# Patient Record
Sex: MALE | Race: WHITE | NOT HISPANIC OR LATINO
[De-identification: names, ages, dates, MRNs, and addresses within clinical notes are randomized per-mention and may not be internally consistent; named-entity substitution may affect disease eponyms.]

---

## 2019-02-26 PROBLEM — Z00.00 ENCOUNTER FOR PREVENTIVE HEALTH EXAMINATION: Status: ACTIVE | Noted: 2019-02-26

## 2019-04-11 ENCOUNTER — NON-APPOINTMENT (OUTPATIENT)
Age: 64
End: 2019-04-11

## 2019-04-11 ENCOUNTER — APPOINTMENT (OUTPATIENT)
Dept: CARDIOLOGY | Facility: CLINIC | Age: 64
End: 2019-04-11
Payer: MEDICARE

## 2019-04-11 VITALS
WEIGHT: 224 LBS | DIASTOLIC BLOOD PRESSURE: 72 MMHG | HEIGHT: 71 IN | BODY MASS INDEX: 31.36 KG/M2 | HEART RATE: 80 BPM | SYSTOLIC BLOOD PRESSURE: 132 MMHG | OXYGEN SATURATION: 97 %

## 2019-04-11 PROCEDURE — 93000 ELECTROCARDIOGRAM COMPLETE: CPT

## 2019-04-11 PROCEDURE — 99205 OFFICE O/P NEW HI 60 MIN: CPT

## 2019-04-22 ENCOUNTER — APPOINTMENT (OUTPATIENT)
Dept: CARDIOLOGY | Facility: CLINIC | Age: 64
End: 2019-04-22
Payer: MEDICARE

## 2019-04-22 PROCEDURE — 93306 TTE W/DOPPLER COMPLETE: CPT

## 2019-04-25 ENCOUNTER — APPOINTMENT (OUTPATIENT)
Dept: CARDIOLOGY | Facility: CLINIC | Age: 64
End: 2019-04-25
Payer: MEDICARE

## 2019-04-25 PROCEDURE — 78452 HT MUSCLE IMAGE SPECT MULT: CPT

## 2019-04-25 PROCEDURE — 93015 CV STRESS TEST SUPVJ I&R: CPT

## 2019-04-25 PROCEDURE — A9502: CPT

## 2019-05-06 ENCOUNTER — APPOINTMENT (OUTPATIENT)
Dept: CARDIOLOGY | Facility: CLINIC | Age: 64
End: 2019-05-06
Payer: MEDICARE

## 2019-05-06 VITALS
BODY MASS INDEX: 31.78 KG/M2 | HEIGHT: 71 IN | DIASTOLIC BLOOD PRESSURE: 70 MMHG | HEART RATE: 70 BPM | SYSTOLIC BLOOD PRESSURE: 132 MMHG | OXYGEN SATURATION: 97 % | WEIGHT: 227 LBS

## 2019-05-06 PROCEDURE — 99214 OFFICE O/P EST MOD 30 MIN: CPT

## 2019-05-06 NOTE — ASSESSMENT
[FreeTextEntry1] :  \par Hypertension, well controlled.  Continue current medication.  Low-salt diet.\par \par Dyslipidemia.  Low-cholesterol diet.  Continue current medications.  Lipid profile to be ordered.\par \par Obesity.  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  Echocardiography confirmed normal LV wall motion, LVEF; myocardial perfusion scan did not show any\par  inducible ischemia.\par \par Asc Aortic Aneurysm- repeat Echo in future.\par \par Aggressive risk factor modification has been discussed with him at a great length.  He will be reevaluated by me after cardiac testing.\par

## 2019-05-06 NOTE — HISTORY OF PRESENT ILLNESS
[FreeTextEntry1] :  \par Mr. Sainz is a very pleasant 63-year-old gentleman  came  for cardiac testing FU.   \par \par 19   Echo- dilated asc Aortic root, normal LVEF\par \par 19   Nuclear stress test; exercised for 9 minutes on Renny protocol with Apical fixed defect due to attenuaton.\par \par \par His history includes hypertension and dyslipidemia.  Recently, his EKG was found to be abnormal.  The patient gets short of breath easily but no exertional chest pain.  He denies PND, orthopnea, diaphoresis, dizziness, palpitations, or pedal edema.  He denies history of CHF, MI, or syncope.  The patient does not do exercise per se.  He knows he is overweight.  Unfortunately, he is unable to drop weight.  His father  at 80s with congestive heart failure and his sister  complication after open heart surgery.\par

## 2019-05-06 NOTE — PHYSICAL EXAM
[General Appearance - Well Developed] : well developed [Normal Appearance] : normal appearance [Well Groomed] : well groomed [No Deformities] : no deformities [General Appearance - Well Nourished] : well nourished [General Appearance - In No Acute Distress] : no acute distress [Normal Conjunctiva] : the conjunctiva exhibited no abnormalities [Eyelids - No Xanthelasma] : the eyelids demonstrated no xanthelasmas [Normal Oral Mucosa] : normal oral mucosa [No Oral Pallor] : no oral pallor [No Oral Cyanosis] : no oral cyanosis [Normal Jugular Venous V Waves Present] : normal jugular venous V waves present [Normal Jugular Venous A Waves Present] : normal jugular venous A waves present [No Jugular Venous Bates A Waves] : no jugular venous bates A waves [Respiration, Rhythm And Depth] : normal respiratory rhythm and effort [Auscultation Breath Sounds / Voice Sounds] : lungs were clear to auscultation bilaterally [Heart Rate And Rhythm] : heart rate and rhythm were normal [Exaggerated Use Of Accessory Muscles For Inspiration] : no accessory muscle use [Heart Sounds] : normal S1 and S2 [Murmurs] : no murmurs present [Abdomen Soft] : soft [Abdomen Tenderness] : non-tender [Abdomen Mass (___ Cm)] : no abdominal mass palpated [Abnormal Walk] : normal gait [Gait - Sufficient For Exercise Testing] : the gait was sufficient for exercise testing [Nail Clubbing] : no clubbing of the fingernails [Cyanosis, Localized] : no localized cyanosis [Petechial Hemorrhages (___cm)] : no petechial hemorrhages [Skin Color & Pigmentation] : normal skin color and pigmentation [] : no rash [No Venous Stasis] : no venous stasis [Skin Lesions] : no skin lesions [No Xanthoma] : no  xanthoma was observed [No Skin Ulcers] : no skin ulcer [Oriented To Time, Place, And Person] : oriented to person, place, and time [Mood] : the mood was normal [Affect] : the affect was normal [No Anxiety] : not feeling anxious

## 2019-11-04 ENCOUNTER — APPOINTMENT (OUTPATIENT)
Dept: CARDIOLOGY | Facility: CLINIC | Age: 64
End: 2019-11-04
Payer: MEDICARE

## 2019-11-04 VITALS
HEIGHT: 71 IN | HEART RATE: 67 BPM | BODY MASS INDEX: 31.5 KG/M2 | WEIGHT: 225 LBS | OXYGEN SATURATION: 96 % | DIASTOLIC BLOOD PRESSURE: 70 MMHG | SYSTOLIC BLOOD PRESSURE: 120 MMHG

## 2019-11-04 PROCEDURE — 99214 OFFICE O/P EST MOD 30 MIN: CPT

## 2019-11-04 RX ORDER — LISINOPRIL 20 MG/1
20 TABLET ORAL
Qty: 90 | Refills: 1 | Status: ACTIVE | COMMUNITY
Start: 1900-01-01 | End: 1900-01-01

## 2020-06-25 ENCOUNTER — NON-APPOINTMENT (OUTPATIENT)
Age: 65
End: 2020-06-25

## 2020-06-25 ENCOUNTER — APPOINTMENT (OUTPATIENT)
Dept: CARDIOLOGY | Facility: CLINIC | Age: 65
End: 2020-06-25
Payer: MEDICARE

## 2020-06-25 VITALS
SYSTOLIC BLOOD PRESSURE: 120 MMHG | DIASTOLIC BLOOD PRESSURE: 68 MMHG | WEIGHT: 230 LBS | TEMPERATURE: 98.7 F | OXYGEN SATURATION: 96 % | HEART RATE: 90 BPM | BODY MASS INDEX: 32.08 KG/M2

## 2020-06-25 PROCEDURE — 93000 ELECTROCARDIOGRAM COMPLETE: CPT

## 2020-06-25 PROCEDURE — 99214 OFFICE O/P EST MOD 30 MIN: CPT

## 2021-01-19 ENCOUNTER — APPOINTMENT (OUTPATIENT)
Dept: CARDIOLOGY | Facility: CLINIC | Age: 66
End: 2021-01-19
Payer: MEDICARE

## 2021-01-19 VITALS
SYSTOLIC BLOOD PRESSURE: 132 MMHG | DIASTOLIC BLOOD PRESSURE: 82 MMHG | WEIGHT: 234 LBS | BODY MASS INDEX: 32.64 KG/M2 | TEMPERATURE: 97.3 F | OXYGEN SATURATION: 98 % | HEART RATE: 70 BPM

## 2021-01-19 DIAGNOSIS — Z72.3 LACK OF PHYSICAL EXERCISE: ICD-10-CM

## 2021-01-19 DIAGNOSIS — Z82.49 FAMILY HISTORY OF ISCHEMIC HEART DISEASE AND OTHER DISEASES OF THE CIRCULATORY SYSTEM: ICD-10-CM

## 2021-01-19 DIAGNOSIS — Z78.9 OTHER SPECIFIED HEALTH STATUS: ICD-10-CM

## 2021-01-19 DIAGNOSIS — Z87.891 PERSONAL HISTORY OF NICOTINE DEPENDENCE: ICD-10-CM

## 2021-01-19 PROCEDURE — 99072 ADDL SUPL MATRL&STAF TM PHE: CPT

## 2021-01-19 PROCEDURE — 99214 OFFICE O/P EST MOD 30 MIN: CPT

## 2021-01-19 RX ORDER — ATORVASTATIN CALCIUM 20 MG/1
20 TABLET, FILM COATED ORAL DAILY
Qty: 90 | Refills: 1 | Status: ACTIVE | COMMUNITY

## 2021-06-24 ENCOUNTER — APPOINTMENT (OUTPATIENT)
Dept: ORTHOPEDIC SURGERY | Facility: CLINIC | Age: 66
End: 2021-06-24
Payer: MEDICARE

## 2021-06-24 VITALS
BODY MASS INDEX: 33.26 KG/M2 | WEIGHT: 237.6 LBS | HEIGHT: 71 IN | SYSTOLIC BLOOD PRESSURE: 164 MMHG | HEART RATE: 69 BPM | DIASTOLIC BLOOD PRESSURE: 93 MMHG | TEMPERATURE: 97.8 F

## 2021-06-24 DIAGNOSIS — Z85.820 PERSONAL HISTORY OF MALIGNANT MELANOMA OF SKIN: ICD-10-CM

## 2021-06-24 DIAGNOSIS — M17.10 UNILATERAL PRIMARY OSTEOARTHRITIS, UNSPECIFIED KNEE: ICD-10-CM

## 2021-06-24 DIAGNOSIS — Z86.39 PERSONAL HISTORY OF OTHER ENDOCRINE, NUTRITIONAL AND METABOLIC DISEASE: ICD-10-CM

## 2021-06-24 DIAGNOSIS — M16.10 UNILATERAL PRIMARY OSTEOARTHRITIS, UNSPECIFIED HIP: ICD-10-CM

## 2021-06-24 DIAGNOSIS — Z86.79 PERSONAL HISTORY OF OTHER DISEASES OF THE CIRCULATORY SYSTEM: ICD-10-CM

## 2021-06-24 PROCEDURE — 99203 OFFICE O/P NEW LOW 30 MIN: CPT

## 2021-06-24 PROCEDURE — 73502 X-RAY EXAM HIP UNI 2-3 VIEWS: CPT | Mod: RT

## 2021-06-30 ENCOUNTER — APPOINTMENT (OUTPATIENT)
Dept: CARDIOLOGY | Facility: CLINIC | Age: 66
End: 2021-06-30
Payer: MEDICARE

## 2021-06-30 ENCOUNTER — NON-APPOINTMENT (OUTPATIENT)
Age: 66
End: 2021-06-30

## 2021-06-30 VITALS
WEIGHT: 240 LBS | TEMPERATURE: 98 F | HEIGHT: 71 IN | DIASTOLIC BLOOD PRESSURE: 70 MMHG | BODY MASS INDEX: 33.6 KG/M2 | SYSTOLIC BLOOD PRESSURE: 110 MMHG | HEART RATE: 88 BPM | OXYGEN SATURATION: 96 %

## 2021-06-30 PROCEDURE — 93000 ELECTROCARDIOGRAM COMPLETE: CPT

## 2021-06-30 PROCEDURE — 99214 OFFICE O/P EST MOD 30 MIN: CPT

## 2021-06-30 RX ORDER — ASPIRIN 81 MG
81 TABLET, DELAYED RELEASE (ENTERIC COATED) ORAL
Refills: 0 | Status: ACTIVE | COMMUNITY

## 2021-06-30 NOTE — REVIEW OF SYSTEMS
[SOB] : shortness of breath [Palpitations] : palpitations [Joint Pain] : joint pain [Joint Stiffness] : joint stiffness [Negative] : Psychiatric [Lower Ext Edema] : no extremity edema

## 2021-06-30 NOTE — ASSESSMENT
[FreeTextEntry1] :  \par Hypertension, well controlled.  Continue current medication.  Low-salt diet.\par \par Dyslipidemia.  Low-cholesterol diet.  Continue current medications.  Most recent labs will be requested.  \par \par Obesity.  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 Stable.  No anginal symptoms. \par \par SOB/Palpitations:  Occurred with exertion in extreme heat.  Recommend increase hydration, and not to exert himself in extremely hot weather.  If symptoms persist, he will contact us immediately.  As episodes are very sporadic, small possibility of detecting rhythm with 2 week Zio.  Would consider 1 month event recorder if necessary. \par \par Asc Aortic Aneurysm- repeat Echo\par \par Aggressive risk factor modification has been discussed with him at a great length.  \par \par If echocardiogram is normal, he may proceed with the surgery.  This note will be updated once those results are available.\par

## 2021-06-30 NOTE — PHYSICAL EXAM
[General Appearance - Well Developed] : well developed [Normal Appearance] : normal appearance [Well Groomed] : well groomed [General Appearance - Well Nourished] : well nourished [No Deformities] : no deformities [General Appearance - In No Acute Distress] : no acute distress [Normal Conjunctiva] : the conjunctiva exhibited no abnormalities [Eyelids - No Xanthelasma] : the eyelids demonstrated no xanthelasmas [Normal Oral Mucosa] : normal oral mucosa [No Oral Pallor] : no oral pallor [No Oral Cyanosis] : no oral cyanosis [Normal Jugular Venous A Waves Present] : normal jugular venous A waves present [Normal Jugular Venous V Waves Present] : normal jugular venous V waves present [No Jugular Venous Bates A Waves] : no jugular venous bates A waves [Respiration, Rhythm And Depth] : normal respiratory rhythm and effort [Exaggerated Use Of Accessory Muscles For Inspiration] : no accessory muscle use [Auscultation Breath Sounds / Voice Sounds] : lungs were clear to auscultation bilaterally [Heart Rate And Rhythm] : heart rate and rhythm were normal [Heart Sounds] : normal S1 and S2 [Murmurs] : no murmurs present [Abdomen Soft] : soft [Abdomen Tenderness] : non-tender [Abdomen Mass (___ Cm)] : no abdominal mass palpated [Abnormal Walk] : normal gait [Gait - Sufficient For Exercise Testing] : the gait was sufficient for exercise testing [Nail Clubbing] : no clubbing of the fingernails [Cyanosis, Localized] : no localized cyanosis [Petechial Hemorrhages (___cm)] : no petechial hemorrhages [Skin Color & Pigmentation] : normal skin color and pigmentation [] : no rash [No Venous Stasis] : no venous stasis [Skin Lesions] : no skin lesions [No Skin Ulcers] : no skin ulcer [No Xanthoma] : no  xanthoma was observed [Oriented To Time, Place, And Person] : oriented to person, place, and time [Affect] : the affect was normal [Mood] : the mood was normal [No Anxiety] : not feeling anxious

## 2021-07-08 ENCOUNTER — OUTPATIENT (OUTPATIENT)
Dept: OUTPATIENT SERVICES | Facility: HOSPITAL | Age: 66
LOS: 1 days | End: 2021-07-08
Payer: COMMERCIAL

## 2021-07-08 VITALS
DIASTOLIC BLOOD PRESSURE: 76 MMHG | SYSTOLIC BLOOD PRESSURE: 131 MMHG | OXYGEN SATURATION: 96 % | RESPIRATION RATE: 14 BRPM | HEART RATE: 75 BPM | HEIGHT: 70 IN | WEIGHT: 239.86 LBS | TEMPERATURE: 98 F

## 2021-07-08 DIAGNOSIS — Z98.890 OTHER SPECIFIED POSTPROCEDURAL STATES: Chronic | ICD-10-CM

## 2021-07-08 DIAGNOSIS — Z96.642 PRESENCE OF LEFT ARTIFICIAL HIP JOINT: Chronic | ICD-10-CM

## 2021-07-08 DIAGNOSIS — M16.11 UNILATERAL PRIMARY OSTEOARTHRITIS, RIGHT HIP: ICD-10-CM

## 2021-07-08 DIAGNOSIS — Z01.818 ENCOUNTER FOR OTHER PREPROCEDURAL EXAMINATION: ICD-10-CM

## 2021-07-08 DIAGNOSIS — Z96.651 PRESENCE OF RIGHT ARTIFICIAL KNEE JOINT: Chronic | ICD-10-CM

## 2021-07-08 LAB
ALBUMIN SERPL ELPH-MCNC: 4.1 G/DL — SIGNIFICANT CHANGE UP (ref 3.3–5)
ALP SERPL-CCNC: 75 U/L — SIGNIFICANT CHANGE UP (ref 30–120)
ALT FLD-CCNC: 38 U/L DA — SIGNIFICANT CHANGE UP (ref 10–60)
ANION GAP SERPL CALC-SCNC: 8 MMOL/L — SIGNIFICANT CHANGE UP (ref 5–17)
APTT BLD: 29.4 SEC — SIGNIFICANT CHANGE UP (ref 27.5–35.5)
AST SERPL-CCNC: 25 U/L — SIGNIFICANT CHANGE UP (ref 10–40)
BILIRUB SERPL-MCNC: 0.8 MG/DL — SIGNIFICANT CHANGE UP (ref 0.2–1.2)
BLD GP AB SCN SERPL QL: SIGNIFICANT CHANGE UP
BUN SERPL-MCNC: 18 MG/DL — SIGNIFICANT CHANGE UP (ref 7–23)
CALCIUM SERPL-MCNC: 9.9 MG/DL — SIGNIFICANT CHANGE UP (ref 8.4–10.5)
CHLORIDE SERPL-SCNC: 104 MMOL/L — SIGNIFICANT CHANGE UP (ref 96–108)
CO2 SERPL-SCNC: 27 MMOL/L — SIGNIFICANT CHANGE UP (ref 22–31)
CREAT SERPL-MCNC: 0.9 MG/DL — SIGNIFICANT CHANGE UP (ref 0.5–1.3)
GLUCOSE SERPL-MCNC: 101 MG/DL — HIGH (ref 70–99)
HCT VFR BLD CALC: 43.5 % — SIGNIFICANT CHANGE UP (ref 39–50)
HGB BLD-MCNC: 15.2 G/DL — SIGNIFICANT CHANGE UP (ref 13–17)
INR BLD: 0.98 RATIO — SIGNIFICANT CHANGE UP (ref 0.88–1.16)
MCHC RBC-ENTMCNC: 32.2 PG — SIGNIFICANT CHANGE UP (ref 27–34)
MCHC RBC-ENTMCNC: 34.9 GM/DL — SIGNIFICANT CHANGE UP (ref 32–36)
MCV RBC AUTO: 92.2 FL — SIGNIFICANT CHANGE UP (ref 80–100)
MRSA PCR RESULT.: SIGNIFICANT CHANGE UP
NRBC # BLD: 0 /100 WBCS — SIGNIFICANT CHANGE UP (ref 0–0)
PLATELET # BLD AUTO: 217 K/UL — SIGNIFICANT CHANGE UP (ref 150–400)
POTASSIUM SERPL-MCNC: 4.2 MMOL/L — SIGNIFICANT CHANGE UP (ref 3.5–5.3)
POTASSIUM SERPL-SCNC: 4.2 MMOL/L — SIGNIFICANT CHANGE UP (ref 3.5–5.3)
PROT SERPL-MCNC: 7.7 G/DL — SIGNIFICANT CHANGE UP (ref 6–8.3)
PROTHROM AB SERPL-ACNC: 11.9 SEC — SIGNIFICANT CHANGE UP (ref 10.6–13.6)
RBC # BLD: 4.72 M/UL — SIGNIFICANT CHANGE UP (ref 4.2–5.8)
RBC # FLD: 12.3 % — SIGNIFICANT CHANGE UP (ref 10.3–14.5)
S AUREUS DNA NOSE QL NAA+PROBE: SIGNIFICANT CHANGE UP
SODIUM SERPL-SCNC: 139 MMOL/L — SIGNIFICANT CHANGE UP (ref 135–145)
WBC # BLD: 7.11 K/UL — SIGNIFICANT CHANGE UP (ref 3.8–10.5)
WBC # FLD AUTO: 7.11 K/UL — SIGNIFICANT CHANGE UP (ref 3.8–10.5)

## 2021-07-08 PROCEDURE — G0463: CPT

## 2021-07-08 RX ORDER — ASPIRIN/CALCIUM CARB/MAGNESIUM 324 MG
1 TABLET ORAL
Qty: 0 | Refills: 0 | DISCHARGE

## 2021-07-08 NOTE — H&P PST ADULT - NSICDXPASTMEDICALHX_GEN_ALL_CORE_FT
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

## 2021-07-08 NOTE — H&P PST ADULT - HISTORY OF PRESENT ILLNESS
64 yo male presents with 3 month history of right groin pain. Xrays revealed osteoarthritis. Currently reports 0-2/10 pain at rest, 5-6/10 pain with daily activities and increased to 9/10 with certain movements. Currently denies using any pain relief measures.

## 2021-07-08 NOTE — H&P PST ADULT - NSICDXFAMILYHX_GEN_ALL_CORE_FT
FAMILY HISTORY:  Father  Still living? No  Family history of CHF (congestive heart failure), Age at diagnosis: Age Unknown  Family history of prostate cancer, Age at diagnosis: Age Unknown  Family history of type 2 diabetes mellitus, Age at diagnosis: Age Unknown    Mother  Still living? No  Family history of Alzheimer's disease, Age at diagnosis: Age Unknown    Sibling  Still living? No  Family history of endocarditis, Age at diagnosis: Age Unknown  Family history of heart attack, Age at diagnosis: Age Unknown  Family history of heart valve abnormality, Age at diagnosis: Age Unknown    Grandparent  Still living? No  Family history of heart attack, Age at diagnosis: Age Unknown    Uncle  Still living? No  Family history of heart attack, Age at diagnosis: Age Unknown

## 2021-07-08 NOTE — H&P PST ADULT - NSANTHOSAYNRD_GEN_A_CORE
neck/No. TONIA screening performed.  STOP BANG Legend: 0-2 = LOW Risk; 3-4 = INTERMEDIATE Risk; 5-8 = HIGH Risk neck 18 inches/No. TONIA screening performed.  STOP BANG Legend: 0-2 = LOW Risk; 3-4 = INTERMEDIATE Risk; 5-8 = HIGH Risk

## 2021-07-08 NOTE — H&P PST ADULT - NSICDXPASTSURGICALHX_GEN_ALL_CORE_FT
PAST SURGICAL HISTORY:  H/O total knee replacement, right 2013    History of left hip replacement 2012    History of melanoma excision 2017, behind right ear    History of tonsillectomy and adenoidectomy as child

## 2021-07-08 NOTE — H&P PST ADULT - NSICDXPROBLEM_GEN_ALL_CORE_FT
PROBLEM DIAGNOSES  Problem: Primary osteoarthritis of right hip  Assessment and Plan: right THR. medical and cardiac clearances requested. obtain stress and echo reports. Instructed to stop aspirin 1 week prior to surgery. ERAS and surgical wash instructions reviewed and verbalized understanding.

## 2021-07-09 ENCOUNTER — APPOINTMENT (OUTPATIENT)
Dept: CARDIOLOGY | Facility: CLINIC | Age: 66
End: 2021-07-09
Payer: MEDICARE

## 2021-07-09 PROCEDURE — 93306 TTE W/DOPPLER COMPLETE: CPT

## 2021-07-13 ENCOUNTER — APPOINTMENT (OUTPATIENT)
Dept: CARDIOLOGY | Facility: CLINIC | Age: 66
End: 2021-07-13
Payer: MEDICARE

## 2021-07-13 VITALS
DIASTOLIC BLOOD PRESSURE: 70 MMHG | TEMPERATURE: 97.3 F | HEART RATE: 80 BPM | SYSTOLIC BLOOD PRESSURE: 120 MMHG | BODY MASS INDEX: 33.47 KG/M2 | OXYGEN SATURATION: 96 % | WEIGHT: 240 LBS

## 2021-07-13 PROCEDURE — 99214 OFFICE O/P EST MOD 30 MIN: CPT

## 2021-07-16 PROBLEM — E78.5 HYPERLIPIDEMIA, UNSPECIFIED: Chronic | Status: ACTIVE | Noted: 2021-07-08

## 2021-07-16 PROBLEM — M19.90 UNSPECIFIED OSTEOARTHRITIS, UNSPECIFIED SITE: Chronic | Status: ACTIVE | Noted: 2021-07-08

## 2021-07-16 PROBLEM — Z92.29 PERSONAL HISTORY OF OTHER DRUG THERAPY: Chronic | Status: ACTIVE | Noted: 2021-07-08

## 2021-07-16 PROBLEM — E66.9 OBESITY, UNSPECIFIED: Chronic | Status: ACTIVE | Noted: 2021-07-08

## 2021-07-16 PROBLEM — R94.31 ABNORMAL ELECTROCARDIOGRAM [ECG] [EKG]: Chronic | Status: ACTIVE | Noted: 2021-07-08

## 2021-07-16 PROBLEM — R06.00 DYSPNEA, UNSPECIFIED: Chronic | Status: ACTIVE | Noted: 2021-07-08

## 2021-07-16 PROBLEM — I71.2 THORACIC AORTIC ANEURYSM, WITHOUT RUPTURE: Chronic | Status: ACTIVE | Noted: 2021-07-08

## 2021-07-16 PROBLEM — C43.9 MALIGNANT MELANOMA OF SKIN, UNSPECIFIED: Chronic | Status: ACTIVE | Noted: 2021-07-08

## 2021-07-16 PROBLEM — I10 ESSENTIAL (PRIMARY) HYPERTENSION: Chronic | Status: ACTIVE | Noted: 2021-07-08

## 2021-07-18 ENCOUNTER — FORM ENCOUNTER (OUTPATIENT)
Age: 66
End: 2021-07-18

## 2021-07-19 ENCOUNTER — TRANSCRIPTION ENCOUNTER (OUTPATIENT)
Age: 66
End: 2021-07-19

## 2021-07-19 ENCOUNTER — APPOINTMENT (OUTPATIENT)
Dept: ORTHOPEDIC SURGERY | Facility: HOSPITAL | Age: 66
End: 2021-07-19

## 2021-07-19 ENCOUNTER — RESULT REVIEW (OUTPATIENT)
Age: 66
End: 2021-07-19

## 2021-07-19 ENCOUNTER — INPATIENT (INPATIENT)
Facility: HOSPITAL | Age: 66
LOS: 0 days | Discharge: HOME | DRG: 470 | End: 2021-07-20
Attending: ORTHOPAEDIC SURGERY | Admitting: ORTHOPAEDIC SURGERY
Payer: COMMERCIAL

## 2021-07-19 VITALS
HEIGHT: 70.5 IN | OXYGEN SATURATION: 98 % | TEMPERATURE: 99 F | RESPIRATION RATE: 18 BRPM | DIASTOLIC BLOOD PRESSURE: 77 MMHG | SYSTOLIC BLOOD PRESSURE: 127 MMHG | HEART RATE: 65 BPM | WEIGHT: 233.47 LBS

## 2021-07-19 DIAGNOSIS — Z96.642 PRESENCE OF LEFT ARTIFICIAL HIP JOINT: Chronic | ICD-10-CM

## 2021-07-19 DIAGNOSIS — M16.11 UNILATERAL PRIMARY OSTEOARTHRITIS, RIGHT HIP: ICD-10-CM

## 2021-07-19 DIAGNOSIS — Z98.890 OTHER SPECIFIED POSTPROCEDURAL STATES: Chronic | ICD-10-CM

## 2021-07-19 DIAGNOSIS — Z96.651 PRESENCE OF RIGHT ARTIFICIAL KNEE JOINT: Chronic | ICD-10-CM

## 2021-07-19 LAB
ANION GAP SERPL CALC-SCNC: 11 MMOL/L — SIGNIFICANT CHANGE UP (ref 5–17)
BUN SERPL-MCNC: 15 MG/DL — SIGNIFICANT CHANGE UP (ref 7–23)
CALCIUM SERPL-MCNC: 9.1 MG/DL — SIGNIFICANT CHANGE UP (ref 8.4–10.5)
CHLORIDE SERPL-SCNC: 104 MMOL/L — SIGNIFICANT CHANGE UP (ref 96–108)
CO2 SERPL-SCNC: 24 MMOL/L — SIGNIFICANT CHANGE UP (ref 22–31)
CREAT SERPL-MCNC: 0.94 MG/DL — SIGNIFICANT CHANGE UP (ref 0.5–1.3)
GLUCOSE SERPL-MCNC: 157 MG/DL — HIGH (ref 70–99)
HCT VFR BLD CALC: 37.1 % — LOW (ref 39–50)
HGB BLD-MCNC: 12.9 G/DL — LOW (ref 13–17)
POTASSIUM SERPL-MCNC: 3.8 MMOL/L — SIGNIFICANT CHANGE UP (ref 3.5–5.3)
POTASSIUM SERPL-SCNC: 3.8 MMOL/L — SIGNIFICANT CHANGE UP (ref 3.5–5.3)
SODIUM SERPL-SCNC: 139 MMOL/L — SIGNIFICANT CHANGE UP (ref 135–145)

## 2021-07-19 PROCEDURE — 88305 TISSUE EXAM BY PATHOLOGIST: CPT | Mod: 26

## 2021-07-19 PROCEDURE — 99223 1ST HOSP IP/OBS HIGH 75: CPT

## 2021-07-19 PROCEDURE — 73502 X-RAY EXAM HIP UNI 2-3 VIEWS: CPT | Mod: 26,RT

## 2021-07-19 PROCEDURE — 88311 DECALCIFY TISSUE: CPT | Mod: 26

## 2021-07-19 PROCEDURE — 27130 TOTAL HIP ARTHROPLASTY: CPT | Mod: RT

## 2021-07-19 RX ORDER — POLYETHYLENE GLYCOL 3350 17 G/17G
17 POWDER, FOR SOLUTION ORAL AT BEDTIME
Refills: 0 | Status: DISCONTINUED | OUTPATIENT
Start: 2021-07-19 | End: 2021-07-20

## 2021-07-19 RX ORDER — CHOLECALCIFEROL (VITAMIN D3) 125 MCG
1 CAPSULE ORAL
Qty: 0 | Refills: 0 | DISCHARGE

## 2021-07-19 RX ORDER — OXYCODONE HYDROCHLORIDE 5 MG/1
5 TABLET ORAL
Refills: 0 | Status: DISCONTINUED | OUTPATIENT
Start: 2021-07-19 | End: 2021-07-19

## 2021-07-19 RX ORDER — VANCOMYCIN HCL 1 G
1500 VIAL (EA) INTRAVENOUS ONCE
Refills: 0 | Status: COMPLETED | OUTPATIENT
Start: 2021-07-19 | End: 2021-07-19

## 2021-07-19 RX ORDER — TRANEXAMIC ACID 100 MG/ML
1000 INJECTION, SOLUTION INTRAVENOUS ONCE
Refills: 0 | Status: COMPLETED | OUTPATIENT
Start: 2021-07-19 | End: 2021-07-19

## 2021-07-19 RX ORDER — SODIUM CHLORIDE 9 MG/ML
1000 INJECTION, SOLUTION INTRAVENOUS
Refills: 0 | Status: DISCONTINUED | OUTPATIENT
Start: 2021-07-19 | End: 2021-07-20

## 2021-07-19 RX ORDER — TRAMADOL HYDROCHLORIDE 50 MG/1
50 TABLET ORAL EVERY 4 HOURS
Refills: 0 | Status: DISCONTINUED | OUTPATIENT
Start: 2021-07-19 | End: 2021-07-20

## 2021-07-19 RX ORDER — ONDANSETRON 8 MG/1
4 TABLET, FILM COATED ORAL ONCE
Refills: 0 | Status: DISCONTINUED | OUTPATIENT
Start: 2021-07-19 | End: 2021-07-19

## 2021-07-19 RX ORDER — ACETAMINOPHEN 500 MG
1000 TABLET ORAL EVERY 8 HOURS
Refills: 0 | Status: DISCONTINUED | OUTPATIENT
Start: 2021-07-20 | End: 2021-07-20

## 2021-07-19 RX ORDER — CHLORHEXIDINE GLUCONATE 213 G/1000ML
1 SOLUTION TOPICAL ONCE
Refills: 0 | Status: COMPLETED | OUTPATIENT
Start: 2021-07-19 | End: 2021-07-19

## 2021-07-19 RX ORDER — PANTOPRAZOLE SODIUM 20 MG/1
40 TABLET, DELAYED RELEASE ORAL
Refills: 0 | Status: DISCONTINUED | OUTPATIENT
Start: 2021-07-19 | End: 2021-07-20

## 2021-07-19 RX ORDER — SENNA PLUS 8.6 MG/1
2 TABLET ORAL AT BEDTIME
Refills: 0 | Status: DISCONTINUED | OUTPATIENT
Start: 2021-07-19 | End: 2021-07-20

## 2021-07-19 RX ORDER — ACETAMINOPHEN 500 MG
1000 TABLET ORAL ONCE
Refills: 0 | Status: COMPLETED | OUTPATIENT
Start: 2021-07-19 | End: 2021-07-19

## 2021-07-19 RX ORDER — HYDROMORPHONE HYDROCHLORIDE 2 MG/ML
0.5 INJECTION INTRAMUSCULAR; INTRAVENOUS; SUBCUTANEOUS
Refills: 0 | Status: DISCONTINUED | OUTPATIENT
Start: 2021-07-19 | End: 2021-07-20

## 2021-07-19 RX ORDER — LISINOPRIL 2.5 MG/1
20 TABLET ORAL AT BEDTIME
Refills: 0 | Status: DISCONTINUED | OUTPATIENT
Start: 2021-07-21 | End: 2021-07-20

## 2021-07-19 RX ORDER — LISINOPRIL 2.5 MG/1
1 TABLET ORAL
Qty: 0 | Refills: 0 | DISCHARGE

## 2021-07-19 RX ORDER — ATORVASTATIN CALCIUM 80 MG/1
20 TABLET, FILM COATED ORAL AT BEDTIME
Refills: 0 | Status: DISCONTINUED | OUTPATIENT
Start: 2021-07-19 | End: 2021-07-20

## 2021-07-19 RX ORDER — APREPITANT 80 MG/1
40 CAPSULE ORAL ONCE
Refills: 0 | Status: COMPLETED | OUTPATIENT
Start: 2021-07-19 | End: 2021-07-19

## 2021-07-19 RX ORDER — HYDROMORPHONE HYDROCHLORIDE 2 MG/ML
0.5 INJECTION INTRAMUSCULAR; INTRAVENOUS; SUBCUTANEOUS
Refills: 0 | Status: DISCONTINUED | OUTPATIENT
Start: 2021-07-19 | End: 2021-07-19

## 2021-07-19 RX ORDER — SODIUM CHLORIDE 9 MG/ML
1000 INJECTION, SOLUTION INTRAVENOUS
Refills: 0 | Status: DISCONTINUED | OUTPATIENT
Start: 2021-07-19 | End: 2021-07-19

## 2021-07-19 RX ORDER — ACETAMINOPHEN 500 MG
1000 TABLET ORAL ONCE
Refills: 0 | Status: COMPLETED | OUTPATIENT
Start: 2021-07-20 | End: 2021-07-20

## 2021-07-19 RX ORDER — MAGNESIUM HYDROXIDE 400 MG/1
30 TABLET, CHEWABLE ORAL DAILY
Refills: 0 | Status: DISCONTINUED | OUTPATIENT
Start: 2021-07-19 | End: 2021-07-20

## 2021-07-19 RX ORDER — APIXABAN 2.5 MG/1
2.5 TABLET, FILM COATED ORAL
Refills: 0 | Status: DISCONTINUED | OUTPATIENT
Start: 2021-07-20 | End: 2021-07-20

## 2021-07-19 RX ORDER — TRAMADOL HYDROCHLORIDE 50 MG/1
100 TABLET ORAL EVERY 6 HOURS
Refills: 0 | Status: DISCONTINUED | OUTPATIENT
Start: 2021-07-19 | End: 2021-07-20

## 2021-07-19 RX ORDER — SODIUM CHLORIDE 9 MG/ML
500 INJECTION INTRAMUSCULAR; INTRAVENOUS; SUBCUTANEOUS ONCE
Refills: 0 | Status: COMPLETED | OUTPATIENT
Start: 2021-07-19 | End: 2021-07-19

## 2021-07-19 RX ORDER — OXYCODONE HYDROCHLORIDE 5 MG/1
10 TABLET ORAL
Refills: 0 | Status: DISCONTINUED | OUTPATIENT
Start: 2021-07-19 | End: 2021-07-19

## 2021-07-19 RX ORDER — CELECOXIB 200 MG/1
200 CAPSULE ORAL
Refills: 0 | Status: DISCONTINUED | OUTPATIENT
Start: 2021-07-19 | End: 2021-07-20

## 2021-07-19 RX ORDER — ATORVASTATIN CALCIUM 80 MG/1
1 TABLET, FILM COATED ORAL
Qty: 0 | Refills: 0 | DISCHARGE

## 2021-07-19 RX ADMIN — SODIUM CHLORIDE 100 MILLILITER(S): 9 INJECTION, SOLUTION INTRAVENOUS at 20:20

## 2021-07-19 RX ADMIN — CELECOXIB 200 MILLIGRAM(S): 200 CAPSULE ORAL at 21:33

## 2021-07-19 RX ADMIN — Medication 1000 MILLIGRAM(S): at 20:20

## 2021-07-19 RX ADMIN — SODIUM CHLORIDE 75 MILLILITER(S): 9 INJECTION, SOLUTION INTRAVENOUS at 10:26

## 2021-07-19 RX ADMIN — SODIUM CHLORIDE 1000 MILLILITER(S): 9 INJECTION INTRAMUSCULAR; INTRAVENOUS; SUBCUTANEOUS at 10:25

## 2021-07-19 RX ADMIN — TRAMADOL HYDROCHLORIDE 50 MILLIGRAM(S): 50 TABLET ORAL at 17:29

## 2021-07-19 RX ADMIN — ATORVASTATIN CALCIUM 20 MILLIGRAM(S): 80 TABLET, FILM COATED ORAL at 21:34

## 2021-07-19 RX ADMIN — Medication 300 MILLIGRAM(S): at 17:29

## 2021-07-19 RX ADMIN — Medication 400 MILLIGRAM(S): at 14:57

## 2021-07-19 RX ADMIN — Medication 300 MILLIGRAM(S): at 06:25

## 2021-07-19 RX ADMIN — CELECOXIB 200 MILLIGRAM(S): 200 CAPSULE ORAL at 21:34

## 2021-07-19 RX ADMIN — Medication 400 MILLIGRAM(S): at 20:20

## 2021-07-19 RX ADMIN — APREPITANT 40 MILLIGRAM(S): 80 CAPSULE ORAL at 06:28

## 2021-07-19 RX ADMIN — TRAMADOL HYDROCHLORIDE 50 MILLIGRAM(S): 50 TABLET ORAL at 18:20

## 2021-07-19 RX ADMIN — Medication 1000 MILLIGRAM(S): at 14:57

## 2021-07-19 RX ADMIN — CHLORHEXIDINE GLUCONATE 1 APPLICATION(S): 213 SOLUTION TOPICAL at 06:28

## 2021-07-19 NOTE — DISCHARGE NOTE NURSING/CASE MANAGEMENT/SOCIAL WORK - PATIENT PORTAL LINK FT
You can access the FollowMyHealth Patient Portal offered by [facility] by registering at the following website: [email or web address]. By joining [employer]’s FollowMyHealth portal, you will also be able to view your health information using other applications (apps) compatible with our system.

## 2021-07-19 NOTE — DISCHARGE NOTE PROVIDER - NSDCFUSCHEDAPPT_GEN_ALL_CORE_FT
SRIRAM SONG ; 08/02/2021 ; [facility] [address] [facility]  SRIRAM SONG ; 09/07/2021 ; [facility] Card[facility] [address] SRIRAM Evangelista ; 09/21/2021 ; [facility] [address] [place] [address]

## 2021-07-19 NOTE — DISCHARGE NOTE PROVIDER - HOSPITAL COURSE
The patient is a 65 y.o. male with severe osteoarthritis of the right hip.  After admission on July 19, and receiving pre-operative parenteral prophylactic antibiotics, the patient  underwent an  uncomplicated right total hip replacement by orthopedic surgeon Dr. Myers.    A medical consultation from the Hospitalist service was obtained for post-operative medical co-management. Typical Physical & occupational therapy modalities post surgery were performed including ambulation training, range of motion, ADL's, abd transfers.  The patient had a clean appearing surgical incision with no sign of surgical site infections and had a stable neuro / vascular exam of the operated extremity.  After progression of mobility guided by the PT/ OT staff,  the patient was felt to benefit from further rehabilitative care for restoration to level of function. This was felt to best be accomplished in a ________________.  Discharge and Orthopedic Care instructions were delineated in the Discharge Plan and reviewed with the patient. All medications were delineated in the medication reconciliation tool and key points were reviewed with the patient. They were deemed stable from an Orthopedic & medical standpoint for discharge today.   The patient is a 65 y.o. male with severe osteoarthritis of the right hip.  After admission on July 19, and receiving pre-operative parenteral prophylactic antibiotics, the patient  underwent an  uncomplicated right total hip replacement by orthopedic surgeon Dr. Myers.    A medical consultation from the Hospitalist service was obtained for post-operative medical co-management. Typical Physical & occupational therapy modalities post surgery were performed including ambulation training, range of motion, ADL's, abd transfers.  The patient had a clean appearing surgical incision with no sign of surgical site infections and had a stable neuro / vascular exam of the operated extremity.  After progression of mobility guided by the PT/ OT staff,  the patient was felt to benefit from further rehabilitative care for restoration to level of function. This was felt to best be accomplished at  home.  Discharge and Orthopedic Care instructions were delineated in the Discharge Plan and reviewed with the patient. All medications were delineated in the medication reconciliation tool and key points were reviewed with the patient. They were deemed stable from an Orthopedic & medical standpoint for discharge today.   The patient is a 65 y.o. male with severe osteoarthritis of the right hip.  After admission on July 19, and receiving pre-operative parenteral prophylactic antibiotics, the patient  underwent an  uncomplicated right total hip replacement by orthopedic surgeon Dr. Myers.    A medical consultation from the Hospitalist service was obtained for post-operative medical co-management. Typical Physical & occupational therapy modalities post surgery were performed including ambulation training, range of motion, ADL's, abd transfers.  The patient had a clean appearing surgical incision with no sign of surgical site infections and had a stable neuro / vascular exam of the operated extremity.  After progression of mobility guided by the PT/ OT staff,  the patient was felt to benefit from further rehabilitative care for restoration to level of function. This was felt to best be accomplished at  home.  Discharge and Orthopedic Care instructions were delineated in the Discharge Plan and reviewed with the patient. All medications were delineated in the medication reconciliation tool and key points were reviewed with the patient. They were deemed stable from an Orthopedic & medical standpoint for discharge today.    Addendum: Dr Lauren Internal Medicine Attending.   Pt see and examined. Pt is cleared for discharge today.   (>30mins for discharge)

## 2021-07-19 NOTE — DISCHARGE NOTE PROVIDER - NSDCFUADDAPPT_GEN_ALL_CORE_FT
follow up with Dr. Myers on .... It is advisable to follow up with your primary care provider within the next 2-3 weeks to ensure your medications are appropriate and there are no underlying problems after your procedure.

## 2021-07-19 NOTE — BRIEF OPERATIVE NOTE - NSICDXBRIEFPREOP_GEN_ALL_CORE_FT
PRE-OP DIAGNOSIS:  Degenerative joint disease (DJD) of hip 19-Jul-2021 09:46:52 right Yoselyn Walker

## 2021-07-19 NOTE — CONSULT NOTE ADULT - ASSESSMENT
Mr Sainz is a 64 yo M presenting from home for elective R hip replacement. PMH HLD, HTN, Osteoarthritis.     Right Total Hip Replacement: POD#0.   -plan to start Eliquis tomorrow.   -pain control as ordered  -PT/OT  -orthopedics following    HLD: Continue Lipitor    HTN: BP stable and at goal.   -continue Lisinopril.     DVT ppx: Plan to start Eliquis

## 2021-07-19 NOTE — DISCHARGE NOTE PROVIDER - NSDCCPCAREPLAN_GEN_ALL_CORE_FT
PRINCIPAL DISCHARGE DIAGNOSIS  Diagnosis: Degenerative joint disease (DJD) of hip  Assessment and Plan of Treatment:

## 2021-07-19 NOTE — PHYSICAL THERAPY INITIAL EVALUATION ADULT - ADDITIONAL COMMENTS
Pt lives with spouse in a house w/ 1 step to enter.  Bedroom and bathroom on first floor.  Pt has rolling walker and straight cane

## 2021-07-19 NOTE — PHYSICAL THERAPY INITIAL EVALUATION ADULT - RANGE OF MOTION EXAMINATION, REHAB EVAL
right hip 0-90 deg due to hip precautions/Left LE ROM was WFL (within functional limits)/deficits as listed below

## 2021-07-19 NOTE — DISCHARGE NOTE PROVIDER - NSDCFUADDINST_GEN_ALL_CORE_FT
Call your doctor if you experience:  • An increase in pain not controlled by pain medication or change in activity or  position.  • Temperature greater than 101° F.  • Redness, increased swelling or foul smelling drainage from or around the  incision.  • Numbness, tingling or a change in color or temperature of the operative leg.  • Call your doctor immediately if you experience chest pain, shortness of breath or calf pain.

## 2021-07-19 NOTE — BRIEF OPERATIVE NOTE - NSICDXBRIEFPOSTOP_GEN_ALL_CORE_FT
POST-OP DIAGNOSIS:  Degenerative joint disease (DJD) of hip 19-Jul-2021 09:47:15 right Yoselyn Walker

## 2021-07-19 NOTE — DISCHARGE NOTE PROVIDER - NSDCCPTREATMENT_GEN_ALL_CORE_FT
PRINCIPAL PROCEDURE  Procedure: Hip joint replacement  Findings and Treatment: TOTAL HIP PRECAUTIONS  *Remember to continue all of the precautions for total hip replacement. Your surgeon will tell you when and if you can move beyond these limitations.  • Do not bend your hip more than 90 degrees   • Do not cross your legs or ankles when laying sitting or standing.  • Do not raise your operated leg up with your knee straight.  • DO NOT bend over at your waist.  • Avoid sitting in low, soft chairs such as sofas and car seats. You should sit on a chair using firm pillows to raise the height  of the seat.  • Make sure your bed level is high, so that you maintain proper leg positioning when sitting on the side, or getting in or out.  procedures.  • Avoid sitting in low, soft chairs, such as sofas, easy chairs etc.   • When entering and traveling by car:      -Sit in the front passenger seat. Make sure that the car seat is all the way back and semi-reclined before entering.  • Do not allow your knees to come together when sitting or lying in bed.  • Do not take a tub bath yet.   • Do not resume driving until you have your surgeon’s permission.  Wound Care  • Keep your incision clean and dry.  • You may use an ice 20 minutes on alternating with 20 minutes off for 48 hours post op packs for 20 minutes on and 20 minutes off to decrease pain and swelling.  • Follow up with your Primary Care Physician within 2 weeks from arrival home for examination and blood work.       PRINCIPAL PROCEDURE  Procedure: Hip joint replacement  Findings and Treatment: TOTAL HIP PRECAUTIONS  *Remember to continue all of the precautions for total hip replacement. Your surgeon will tell you when and if you can move beyond these limitations.  • Do not bend your hip more than 90 degrees   • Do not cross your legs or ankles when laying sitting or standing.  • Do not raise your operated leg up with your knee straight.  • DO NOT bend over at your waist.  • Avoid sitting in low, soft chairs such as sofas and car seats. You should sit on a chair using firm pillows to raise the height  of the seat.  • Make sure your bed level is high, so that you maintain proper leg positioning when sitting on the side, or getting in or out.  procedures.  • Avoid sitting in low, soft chairs, such as sofas, easy chairs etc.   • When entering and traveling by car:      -Sit in the front passenger seat. Make sure that the car seat is all the way back and semi-reclined before entering.  • Do not allow your knees to come together when sitting or lying in bed.  • Do not take a tub bath yet.   • Do not resume driving until you have your surgeon’s permission.  Wound Care  [place] dressing is over your hip wound.  It is waterproof and you may shower.  Do not aim shower stream at surgical site and pat dry with clean towel after showering.   Remove [place] dressing 7 days after surgery and leave wound open to air.

## 2021-07-19 NOTE — DISCHARGE NOTE PROVIDER - NSDCMRMEDTOKEN_GEN_ALL_CORE_FT
Aleve 220 mg oral tablet: 2 tab(s) orally every 12 hours, As Needed - for severe pain  atorvastatin 20 mg oral tablet: 1 tab(s) orally once a day (at bedtime)  William Low Dose 81 mg oral delayed release tablet: 1 tab(s) orally once a day (at bedtime)  lisinopril 20 mg oral tablet: 1 tab(s) orally once a day (at bedtime)  Multiple Vitamins oral tablet: 1 tab(s) orally once a day  Vitamin D3 1000 intl units (25 mcg) oral tablet: 1 tab(s) orally once a day   acetaminophen 500 mg oral tablet: 2 tab(s) orally every 8 hours  Aleve 220 mg oral tablet: 2 tab(s) orally every 12 hours, As Needed - for severe pain  apixaban 2.5 mg oral tablet: 1 tab(s) orally every 12 hours   apixaban 2.5 mg oral tablet: 1 tab(s) orally every 12 hours   atorvastatin 20 mg oral tablet: 1 tab(s) orally once a day (at bedtime)  William Low Dose 81 mg oral delayed release tablet: 1 tab(s) orally once a day (at bedtime)  celecoxib 200 mg oral capsule: 1 cap(s) orally 2 times a day. Take at least 2  hours after aspirin  lisinopril 20 mg oral tablet: 1 tab(s) orally once a day (at bedtime)  Multiple Vitamins oral tablet: 1 tab(s) orally once a day  omeprazole 20 mg oral delayed release capsule: 1 cap(s) orally once a day   traMADol 50 mg oral tablet: 1-2 tab(s) orally every 4 hours, As needed, Moderate to Severe Pain   Reference #: [id number] MDD:6  Vitamin D3 1000 intl units (25 mcg) oral tablet: 1 tab(s) orally once a day   acetaminophen 500 mg oral tablet: 2 tab(s) orally every 8 hours  apixaban 2.5 mg oral tablet: 1 tab(s) orally every 12 hours   apixaban 2.5 mg oral tablet: 1 tab(s) orally every 12 hours   atorvastatin 20 mg oral tablet: 1 tab(s) orally once a day (at bedtime)  celecoxib 200 mg oral capsule: 1 cap(s) orally 2 times a day. Take at least 2  hours after aspirin  lisinopril 20 mg oral tablet: 1 tab(s) orally once a day (at bedtime)  Multiple Vitamins oral tablet: 1 tab(s) orally once a day  omeprazole 20 mg oral delayed release capsule: 1 cap(s) orally once a day   traMADol 50 mg oral tablet: 1-2 tab(s) orally every 4 hours, As needed, Moderate to Severe Pain   Reference #: [id number] MDD:6  Vitamin D3 1000 intl units (25 mcg) oral tablet: 1 tab(s) orally once a day

## 2021-07-19 NOTE — DISCHARGE NOTE PROVIDER - CARE PROVIDER_API CALL
Deuce Myers)  Orthopaedic Surgery  [address] [facility], [address]  [place] [address]  Phone: [phone]  Fax: [phone]  Follow Up Time:    Deuce Myers)  Orthopaedic Surgery  [address] [facility], [address]  [place] [address]  Phone: [phone]  Fax: [phone]  Scheduled Appointment: 08/02/2021 11:20 AM

## 2021-07-19 NOTE — CONSULT NOTE ADULT - SUBJECTIVE AND OBJECTIVE BOX
Mr Sainz is a 66 yo M presenting from home for elective R hip replacement. PMH HLD, HTN, Osteoarthritis.   Patient seen and examined post-op. He is feeling well. His R hip pain is controlled, currently an achy sensation he states. He is tolerating PO intake and was able to ambulate with physical therapy.     Allergies: Ampicillin, Penicillin  Social history: social etoh use, quit smoking 3 decades ago, no drug use.   Family history: HTN in father  surgical history: R hip replacement.     CONSTITUTIONAL: denies fever, chills, fatigue, weakness  SKIN: denies new lesions, rash  CARDIOVASCULAR: denies chest pain, chest pressure, palpitations  RESPIRATORY: denies shortness of breath, sputum production  GASTROINTESTINAL: denies nausea, vomiting, diarrhea, abdominal pain, melena, hematochezia  GENITOURINARY: denies dysuria, discharge  NEUROLOGICAL: denies numbness, headache, focal weakness  MUSCULOSKELETAL: R hip soreness  HEMATOLOGIC: denies gross bleeding, bruising    T(C): 36.6 (07-19-21 @ 09:43), Max: 37 (07-19-21 @ 05:58)  HR: 68 (07-19-21 @ 11:30) (62 - 68)  BP: 109/58 (07-19-21 @ 11:30) (93/55 - 127/77)  RR: 15 (07-19-21 @ 11:00) (14 - 18)  SpO2: 96% (07-19-21 @ 11:30) (95% - 99%)  Wt(kg): --    Physical Exam:   GENERAL: well-groomed, well-developed, NAD  HEENT: head NC/AT; EOM intact, conjunctiva & sclera clear; hearing grossly intact, moist mucous membranes  NECK: supple, no JVD  RESPIRATORY: CTA B/L, no wheezing, rales, rhonchi or rubs  CARDIOVASCULAR: S1&S2, RRR, no murmurs or gallops  ABDOMEN: soft, non-tender, non-distended, + Bowel sounds x4 quadrants, no guarding, rebound or rigidity  MUSCULOSKELETAL:  no clubbing, cyanosis or edema of all 4 extremities  LYMPH: no cervical lymphadenopathy  VASCULAR: Radial pulses 2+ bilaterally, no varicose veins   SKIN: warm and dry, color normal  NEUROLOGIC: AA&O X3, CN2-12 intact w/ no focal deficits, no sensory loss, decreased ROM R hip  Psych: Normal mood and affect, normal behavior

## 2021-07-19 NOTE — DISCHARGE NOTE PROVIDER - INSTRUCTIONS
Regular diet  The pain medications are likely to cause constipation.  This problem can be minimized by increasing your fruits and vegetables and fluid intake.  You may benefit from over-the-counter laxatives (such as senokot) and stool softeners such as Colace 100mg 3 times daily.

## 2021-07-19 NOTE — DISCHARGE NOTE NURSING/CASE MANAGEMENT/SOCIAL WORK - NSSCNAMETXT_GEN_ALL_CORE
[facility] ([facility])      [facility] [phone]  [address] [place] [address]

## 2021-07-19 NOTE — DISCHARGE NOTE NURSING/CASE MANAGEMENT/SOCIAL WORK - CASE MANAGER'S NAME
Your [facility] RN/ Xochitl Markham can be reached at [phone] or main office # [phone]

## 2021-07-19 NOTE — DISCHARGE NOTE PROVIDER - PROVIDER TOKENS
PROVIDER:[TOKEN:[[id number]]] PROVIDER:[TOKEN:[[id number]],SCHEDULEDAPPT:[08/02/2021],SCHEDULEDAPPTTIME:[11:20 AM]]

## 2021-07-19 NOTE — PHYSICAL THERAPY INITIAL EVALUATION ADULT - GAIT DEVIATIONS NOTED, PT EVAL
decreased xin/decreased velocity of limb motion/decreased step length/decreased stride length/decreased weight-shifting ability

## 2021-07-19 NOTE — ASU PREOP CHECKLIST - BLOOD AVAILABLE
Called by nurse re: afib to 140's.  Evaluated pt at bedside.     PE:  BP/120/60's; hr 140-150's irregularly irregular, sat 92% 2 L nc    AO4; NAD  irregularly irregular tachycardic  Lung: bibasilar crackles    After evaluation of pt, who was stable and mentating well, confirmed rhythm on tele monitor and gave 12.5 metoprolol PO per day team.    Reevaluated patient in 30 minutes. BP's stable during that time to 100's/60's.     Upon reevaluation, pt was still tachycardic afib to 140's. At this point; pt given 10mg IV cardizem with some temporary reduction to 120's.     Plan discussed with MAR and MAR came to bedside.      Pt's bp's labile during this time though most readings were in 's/50's.     After confirmation of bp and with continued tachycardia; 5mg Lopressor IV given with reduction of heart rate to 100-110. Another dose of 12.5mg metoprolol tartrate also given.    Pt's bp stabilized and was alert and oriented throughout.     Plan:  will ctm on tele  ctm blood pressures  will receive am labs early and monitor.    Eamon Cárdenas, pgy 1 no

## 2021-07-19 NOTE — OCCUPATIONAL THERAPY INITIAL EVALUATION ADULT - ADDITIONAL COMMENTS
Pt lives in a private home [place] [address] +railing to  basement   +stall shower pt owns a RW, SAC crutches 3in1 and hip kit

## 2021-07-19 NOTE — DISCHARGE NOTE NURSING/CASE MANAGEMENT/SOCIAL WORK - NSSCCONTNUM_GEN_ALL_CORE
Please contact the home care agency at [phone] if you have not heard from them by 12 noon on the day after your hospital discharge

## 2021-07-20 VITALS
SYSTOLIC BLOOD PRESSURE: 130 MMHG | DIASTOLIC BLOOD PRESSURE: 82 MMHG | TEMPERATURE: 99 F | OXYGEN SATURATION: 96 % | HEART RATE: 92 BPM | RESPIRATION RATE: 17 BRPM

## 2021-07-20 LAB
ANION GAP SERPL CALC-SCNC: 8 MMOL/L — SIGNIFICANT CHANGE UP (ref 5–17)
BUN SERPL-MCNC: 14 MG/DL — SIGNIFICANT CHANGE UP (ref 7–23)
CALCIUM SERPL-MCNC: 8.9 MG/DL — SIGNIFICANT CHANGE UP (ref 8.4–10.5)
CHLORIDE SERPL-SCNC: 105 MMOL/L — SIGNIFICANT CHANGE UP (ref 96–108)
CO2 SERPL-SCNC: 24 MMOL/L — SIGNIFICANT CHANGE UP (ref 22–31)
COVID-19 SPIKE DOMAIN AB INTERP: POSITIVE
COVID-19 SPIKE DOMAIN ANTIBODY RESULT: >250 U/ML — HIGH
CREAT SERPL-MCNC: 0.82 MG/DL — SIGNIFICANT CHANGE UP (ref 0.5–1.3)
GLUCOSE SERPL-MCNC: 137 MG/DL — HIGH (ref 70–99)
HCT VFR BLD CALC: 34.5 % — LOW (ref 39–50)
HGB BLD-MCNC: 11.8 G/DL — LOW (ref 13–17)
MCHC RBC-ENTMCNC: 31.9 PG — SIGNIFICANT CHANGE UP (ref 27–34)
MCHC RBC-ENTMCNC: 34.2 GM/DL — SIGNIFICANT CHANGE UP (ref 32–36)
MCV RBC AUTO: 93.2 FL — SIGNIFICANT CHANGE UP (ref 80–100)
NRBC # BLD: 0 /100 WBCS — SIGNIFICANT CHANGE UP (ref 0–0)
PLATELET # BLD AUTO: 164 K/UL — SIGNIFICANT CHANGE UP (ref 150–400)
POTASSIUM SERPL-MCNC: 4.1 MMOL/L — SIGNIFICANT CHANGE UP (ref 3.5–5.3)
POTASSIUM SERPL-SCNC: 4.1 MMOL/L — SIGNIFICANT CHANGE UP (ref 3.5–5.3)
RBC # BLD: 3.7 M/UL — LOW (ref 4.2–5.8)
RBC # FLD: 12.3 % — SIGNIFICANT CHANGE UP (ref 10.3–14.5)
SARS-COV-2 IGG+IGM SERPL QL IA: >250 U/ML — HIGH
SARS-COV-2 IGG+IGM SERPL QL IA: POSITIVE
SODIUM SERPL-SCNC: 137 MMOL/L — SIGNIFICANT CHANGE UP (ref 135–145)
WBC # BLD: 14.83 K/UL — HIGH (ref 3.8–10.5)
WBC # FLD AUTO: 14.83 K/UL — HIGH (ref 3.8–10.5)

## 2021-07-20 PROCEDURE — 88305 TISSUE EXAM BY PATHOLOGIST: CPT

## 2021-07-20 PROCEDURE — 80048 BASIC METABOLIC PNL TOTAL CA: CPT

## 2021-07-20 PROCEDURE — 85014 HEMATOCRIT: CPT

## 2021-07-20 PROCEDURE — 97535 SELF CARE MNGMENT TRAINING: CPT

## 2021-07-20 PROCEDURE — 85027 COMPLETE CBC AUTOMATED: CPT

## 2021-07-20 PROCEDURE — 99232 SBSQ HOSP IP/OBS MODERATE 35: CPT

## 2021-07-20 PROCEDURE — 97165 OT EVAL LOW COMPLEX 30 MIN: CPT

## 2021-07-20 PROCEDURE — 85018 HEMOGLOBIN: CPT

## 2021-07-20 PROCEDURE — C1713: CPT

## 2021-07-20 PROCEDURE — 86900 BLOOD TYPING SEROLOGIC ABO: CPT

## 2021-07-20 PROCEDURE — 86850 RBC ANTIBODY SCREEN: CPT

## 2021-07-20 PROCEDURE — 86769 SARS-COV-2 COVID-19 ANTIBODY: CPT

## 2021-07-20 PROCEDURE — 86901 BLOOD TYPING SEROLOGIC RH(D): CPT

## 2021-07-20 PROCEDURE — 73502 X-RAY EXAM HIP UNI 2-3 VIEWS: CPT

## 2021-07-20 PROCEDURE — 97116 GAIT TRAINING THERAPY: CPT

## 2021-07-20 PROCEDURE — 88311 DECALCIFY TISSUE: CPT

## 2021-07-20 PROCEDURE — 94664 DEMO&/EVAL PT USE INHALER: CPT

## 2021-07-20 PROCEDURE — 97530 THERAPEUTIC ACTIVITIES: CPT

## 2021-07-20 PROCEDURE — 36415 COLL VENOUS BLD VENIPUNCTURE: CPT

## 2021-07-20 PROCEDURE — 97161 PT EVAL LOW COMPLEX 20 MIN: CPT

## 2021-07-20 PROCEDURE — C1776: CPT

## 2021-07-20 RX ORDER — APIXABAN 2.5 MG/1
1 TABLET, FILM COATED ORAL
Qty: 60 | Refills: 0
Start: 2021-07-20 | End: 2021-08-18

## 2021-07-20 RX ORDER — TRAMADOL HYDROCHLORIDE 50 MG/1
1 TABLET ORAL
Qty: 42 | Refills: 0
Start: 2021-07-20 | End: 2021-07-26

## 2021-07-20 RX ORDER — APIXABAN 2.5 MG/1
1 TABLET, FILM COATED ORAL
Qty: 9 | Refills: 0
Start: 2021-07-20

## 2021-07-20 RX ORDER — ACETAMINOPHEN 500 MG
2 TABLET ORAL
Qty: 0 | Refills: 0 | DISCHARGE
Start: 2021-07-20

## 2021-07-20 RX ORDER — CELECOXIB 200 MG/1
1 CAPSULE ORAL
Qty: 60 | Refills: 0
Start: 2021-07-20 | End: 2021-08-18

## 2021-07-20 RX ORDER — OMEPRAZOLE 10 MG/1
1 CAPSULE, DELAYED RELEASE ORAL
Qty: 30 | Refills: 1
Start: 2021-07-20 | End: 2021-09-17

## 2021-07-20 RX ORDER — ASPIRIN/CALCIUM CARB/MAGNESIUM 324 MG
1 TABLET ORAL
Qty: 0 | Refills: 0 | DISCHARGE

## 2021-07-20 RX ADMIN — Medication 400 MILLIGRAM(S): at 02:47

## 2021-07-20 RX ADMIN — APIXABAN 2.5 MILLIGRAM(S): 2.5 TABLET, FILM COATED ORAL at 08:18

## 2021-07-20 RX ADMIN — Medication 1000 MILLIGRAM(S): at 02:52

## 2021-07-20 RX ADMIN — CELECOXIB 200 MILLIGRAM(S): 200 CAPSULE ORAL at 08:17

## 2021-07-20 RX ADMIN — PANTOPRAZOLE SODIUM 40 MILLIGRAM(S): 20 TABLET, DELAYED RELEASE ORAL at 06:13

## 2021-07-20 RX ADMIN — Medication 1000 MILLIGRAM(S): at 08:19

## 2021-07-20 RX ADMIN — Medication 1000 MILLIGRAM(S): at 08:18

## 2021-07-20 RX ADMIN — CELECOXIB 200 MILLIGRAM(S): 200 CAPSULE ORAL at 08:19

## 2021-07-20 RX ADMIN — TRAMADOL HYDROCHLORIDE 50 MILLIGRAM(S): 50 TABLET ORAL at 08:23

## 2021-07-20 RX ADMIN — TRAMADOL HYDROCHLORIDE 50 MILLIGRAM(S): 50 TABLET ORAL at 08:53

## 2021-07-20 NOTE — PHARMACOTHERAPY INTERVENTION NOTE - INTERVENTION TYPE MED REC
Spoke to patient and informed him that his lab results were normal.  Also informed him that since gabapentin is not considered a controlled substance he will need to  a written prescription at the office.  He expressed understanding   ----- Message from Angela Trent sent at 7/6/2017 11:53 AM EDT -----  Contact: PT  WOULD LIKE RESULTS OF LABS HE DONE RECENTLY     Med Rec - Admission

## 2021-07-20 NOTE — PROGRESS NOTE ADULT - ASSESSMENT
Mr Sainz is a 64 yo M presenting from home for elective R hip replacement. PMH HLD, HTN, Osteoarthritis.     Right Total Hip Replacement: POD#1  On Eliquis  -pain control as ordered  -PT/OT  -orthopedics following    HLD: Continue Lipitor    HTN: BP stable and at goal.   -continue Lisinopril.     DVT ppx: Eliquis    Discharge home today.

## 2021-07-20 NOTE — PROGRESS NOTE ADULT - SUBJECTIVE AND OBJECTIVE BOX
Discharge medication calendar:  (ASA 81mg Qday preop)  Eliquis 2.5mg q12h x 35 days then resume ASA 81mg Qday  APAP 1000mg q8h x 2-3 weeks  Celecoxib 200mg q12h x 21 days  Omeprazole 20mg QAM x 6 weeks  Narcotic PRN  Docusate 100mg TID while taking narcotic  Miralax, Senna, or Bisacodyl PRN for treatment of constipation  
Post Op Day #1    SUBJECTIVE    64yo Male status post Right THR .   Patient is alert and comfortable.    Pain is controlled with current pain regimen.  Denies nausea, vomiting, chest pain, shortness of breath, abdominal pain or fever.   No new complaints.    OBJECTIVE    Vital Signs Last 24 Hrs  T(C): 36.9 (20 Jul 2021 07:35), Max: 37.1 (19 Jul 2021 15:00)  T(F): 98.4 (20 Jul 2021 07:35), Max: 98.7 (19 Jul 2021 15:00)  HR: 61 (20 Jul 2021 07:35) (61 - 99)  BP: 122/74 (20 Jul 2021 07:35) (93/55 - 138/81)  BP(mean): --  RR: 17 (20 Jul 2021 07:35) (14 - 18)  SpO2: 95% (20 Jul 2021 07:35) (95% - 99%)  I&O's Summary    19 Jul 2021 07:01  -  20 Jul 2021 07:00  --------------------------------------------------------  IN: 0 mL / OUT: 2200 mL / NET: -2200 mL        PHYSICAL EXAM    Right Hip Silverlon dressing is clean, dry and intact.   The calf is supple/nontender.   Sensation to light touch is grossly intact distally.   Motor function distally is intact.   No foot drop.   (2+) dorsalis pedis pulse. Capillary refill is less than 2 seconds. No cyanosis.                          11.8<L>  14.83<H> )-----------( 164      ( 20 Jul 2021 07:17 )             34.5<L>  20 Jul 2021 07:17                        12.9<L>  x     )-----------( x        ( 19 Jul 2021 17:01 )             37.1<L>  19 Jul 2021 17:01    20 Jul 2021 07:17    137    |  105    |  14     ----------------------------<  137<H>  4.1     |  24     |  0.82   19 Jul 2021 17:01    139    |  104    |  15     ----------------------------<  157<H>  3.8     |  24     |  0.94     Ca    8.9        20 Jul 2021 07:17  Ca    9.1        19 Jul 2021 17:01        ASSESSMENT AND PLAN    - Orthopedically stable  - DVT prophylaxis: PAS + Eliquis 2.5mg twice daily  -  HO prophylaxis: Celebrex 200mg PO twice daily x21 days  - Continue physical therapy and occupational therapy  - Weight bearing as tolerated of the right lower extremity with assistance of a walker  - Incentive spirometry encouraged  - Pain control as clinically indicated  - Disposition: Home when cleared by medicine, PT, and OT     
SRIRAM SONG [id number]    Pt is a 65y year old Male s/p right THR. pain is described as a mild ache.  Tolerating regular diet, (+) voids.  Denies chest pain/shortness of breath/nausea/vomitting or headache.     Vital Signs Last 24 Hrs  T(C): 36.6 (19 Jul 2021 09:43), Max: 37 (19 Jul 2021 05:58)  T(F): 97.9 (19 Jul 2021 09:43), Max: 98.6 (19 Jul 2021 05:58)  HR: 68 (19 Jul 2021 11:30) (62 - 68)  BP: 109/58 (19 Jul 2021 11:30) (93/55 - 127/77)  BP(mean): --  RR: 15 (19 Jul 2021 11:00) (14 - 18)  SpO2: 96% (19 Jul 2021 11:30) (95% - 99%)                    PE:   RLE: Silverlon Dressing CDI, Sensation intact to light touch distally, (+2) DP pulses, EHL/FHL/TA intact, Capillary refill < 2 seconds. Negative calf tenderness. PAS on.     A: 65y year old Male s/p right THR     Plan:   -DVT ppx = PAS +Eliquis    -PT/OT = OOB, THR Protocols  -Hip dislocation precautions  -Pain control adequate  -Medicine to follow   -Continue to Follow Labs  -Incentive spirometry  -Stable, Anticipate discharge to home tomorrow.
Patient is a 65y old  Male who presents with a chief complaint of Right THR (19 Jul 2021 15:09)    HPI:  Mr Sainz is a 64 yo M presenting from home for elective R hip replacement. PMH HLD, HTN, Osteoarthritis. Pt is post op day1. Pt notes pain is tolerable. Pt is tolerating PT. Voiding freely, no bowel movement yet.     PAST MEDICAL & SURGICAL HISTORY:  Hypertension    Hyperlipidemia    Ascending aortic aneurysm  echo scheduled for 7/9/21    Abnormal EKG  incomplete right BBB and fascicular block    COVID-19 vaccine series completed  moderna, completed 2/17/21    Osteoarthritis    Dyspnea on exertion  only in hot weather    MM (malignant melanoma of skin)  behind right ear 2017    Obesity (BMI 30.0-34.9)    History of melanoma excision  2017, behind right ear    History of tonsillectomy and adenoidectomy  as child    History of left hip replacement  2012    H/O total knee replacement, right  2013      MEDICATIONS  (STANDING):  acetaminophen   Tablet .. 1000 milliGRAM(s) Oral every 8 hours  apixaban 2.5 milliGRAM(s) Oral <User Schedule>  atorvastatin 20 milliGRAM(s) Oral at bedtime  celecoxib 200 milliGRAM(s) Oral two times a day  lactated ringers. 1000 milliLiter(s) (100 mL/Hr) IV Continuous <Continuous>  pantoprazole    Tablet 40 milliGRAM(s) Oral before breakfast  polyethylene glycol 3350 17 Gram(s) Oral at bedtime  senna 2 Tablet(s) Oral at bedtime    MEDICATIONS  (PRN):  HYDROmorphone  Injectable 0.5 milliGRAM(s) IV Push every 3 hours PRN breakthrough pain  magnesium hydroxide Suspension 30 milliLiter(s) Oral daily PRN Constipation  traMADol 50 milliGRAM(s) Oral every 4 hours PRN Moderate Pain (4 - 6)  traMADol 100 milliGRAM(s) Oral every 6 hours PRN Severe Pain (7 - 10)      EXAM:  Vital Signs Last 24 Hrs  T(C): 37 (20 Jul 2021 11:05), Max: 37.1 (19 Jul 2021 15:00)  T(F): 98.6 (20 Jul 2021 11:05), Max: 98.7 (19 Jul 2021 15:00)  HR: 92 (20 Jul 2021 11:05) (61 - 99)  BP: 130/82 (20 Jul 2021 11:05) (97/59 - 138/81)  BP(mean): --  RR: 17 (20 Jul 2021 11:05) (16 - 17)  SpO2: 96% (20 Jul 2021 11:05) (95% - 97%)    07-19 @ 07:01  -  07-20 @ 07:00  --------------------------------------------------------  IN: 0 mL / OUT: 2200 mL / NET: -2200 mL        PHYSICAL EXAM:  Constitutional: awake  Neck: supple  Neurological: alert and oriented to person, date and place.   Skin: dressing to left hip   Musculoskeletal: moves all 4 extremities, needs assistance on standing.   Psychiatric: appropriate affect.     LABS:                        11.8   14.83 )-----------( 164      ( 20 Jul 2021 07:17 )             34.5   07-20    137  |  105  |  14  ----------------------------<  137<H>  4.1   |  24  |  0.82    Ca    8.9      20 Jul 2021 07:17

## 2021-08-02 ENCOUNTER — APPOINTMENT (OUTPATIENT)
Dept: ORTHOPEDIC SURGERY | Facility: CLINIC | Age: 66
End: 2021-08-02
Payer: MEDICARE

## 2021-08-02 VITALS — HEART RATE: 102 BPM | SYSTOLIC BLOOD PRESSURE: 110 MMHG | DIASTOLIC BLOOD PRESSURE: 74 MMHG

## 2021-08-02 PROCEDURE — 73502 X-RAY EXAM HIP UNI 2-3 VIEWS: CPT | Mod: RT

## 2021-08-02 PROCEDURE — 99024 POSTOP FOLLOW-UP VISIT: CPT

## 2021-08-02 RX ORDER — CLINDAMYCIN HYDROCHLORIDE 300 MG/1
300 CAPSULE ORAL
Qty: 20 | Refills: 3 | Status: ACTIVE | COMMUNITY
Start: 2021-08-02 | End: 1900-01-01

## 2021-08-09 ENCOUNTER — OUTPATIENT (OUTPATIENT)
Dept: OUTPATIENT SERVICES | Facility: HOSPITAL | Age: 66
LOS: 1 days | End: 2021-08-09

## 2021-08-09 DIAGNOSIS — Z96.651 PRESENCE OF RIGHT ARTIFICIAL KNEE JOINT: Chronic | ICD-10-CM

## 2021-08-09 DIAGNOSIS — Z98.890 OTHER SPECIFIED POSTPROCEDURAL STATES: Chronic | ICD-10-CM

## 2021-08-09 DIAGNOSIS — Z96.642 PRESENCE OF LEFT ARTIFICIAL HIP JOINT: Chronic | ICD-10-CM

## 2021-09-07 ENCOUNTER — APPOINTMENT (OUTPATIENT)
Dept: CARDIOLOGY | Facility: CLINIC | Age: 66
End: 2021-09-07
Payer: MEDICARE

## 2021-09-07 VITALS
SYSTOLIC BLOOD PRESSURE: 120 MMHG | OXYGEN SATURATION: 96 % | HEIGHT: 71 IN | HEART RATE: 90 BPM | DIASTOLIC BLOOD PRESSURE: 70 MMHG | WEIGHT: 238 LBS | BODY MASS INDEX: 33.32 KG/M2

## 2021-09-07 PROCEDURE — 93000 ELECTROCARDIOGRAM COMPLETE: CPT

## 2021-09-21 ENCOUNTER — APPOINTMENT (OUTPATIENT)
Dept: ORTHOPEDIC SURGERY | Facility: CLINIC | Age: 66
End: 2021-09-21
Payer: MEDICARE

## 2021-09-21 ENCOUNTER — NON-APPOINTMENT (OUTPATIENT)
Age: 66
End: 2021-09-21

## 2021-09-21 VITALS — DIASTOLIC BLOOD PRESSURE: 72 MMHG | SYSTOLIC BLOOD PRESSURE: 111 MMHG | HEART RATE: 69 BPM

## 2021-09-21 PROCEDURE — 73521 X-RAY EXAM HIPS BI 2 VIEWS: CPT

## 2021-09-21 PROCEDURE — 99024 POSTOP FOLLOW-UP VISIT: CPT

## 2022-02-15 NOTE — HISTORY OF PRESENT ILLNESS
[FreeTextEntry1] :  \par Mr. Sainz is a very pleasant  65 -year-old gentleman presents for preoperative evaluation.  Scheduled to undergo right hip replacement with Dr. Myers at [facility] on 2021..   \par \par The patient gets short of breath easily but no exertional chest pain.  He denies PND, orthopnea, diaphoresis, dizziness, palpitations, or pedal edema.  He denies history of CHF, MI, or syncope.  The patient does not do exercise.  He knows he is overweight.  Unfortunately, he is unable to drop weight.  \par \par Over summer time he was on the roof, it was very hot ; he suddenly had palpitations with sob lasting for 10 minutes; he did not syncopize.  Had another episode several weeks ago.  No CP.  He was mowing his lawn when the symptoms occurred.  \par \par His father  at 80s with congestive heart failure and his sister  complication after open heart surgery.\par \par His history includes hypertension and dyslipidemia. \par \par EKG done in the office today reveals normal sinus rhythm with SHANI B.  No change when compared to prior.\par \par  VIPUL Jeffers

## 2022-03-01 ENCOUNTER — APPOINTMENT (OUTPATIENT)
Dept: CARDIOLOGY | Facility: CLINIC | Age: 67
End: 2022-03-01
Payer: MEDICARE

## 2022-03-01 VITALS
HEART RATE: 77 BPM | OXYGEN SATURATION: 97 % | DIASTOLIC BLOOD PRESSURE: 60 MMHG | BODY MASS INDEX: 47.42 KG/M2 | WEIGHT: 315 LBS | SYSTOLIC BLOOD PRESSURE: 102 MMHG | TEMPERATURE: 97.5 F

## 2022-03-01 DIAGNOSIS — M16.11 UNILATERAL PRIMARY OSTEOARTHRITIS, RIGHT HIP: ICD-10-CM

## 2022-03-01 DIAGNOSIS — R29.898 OTHER SYMPTOMS AND SIGNS INVOLVING THE MUSCULOSKELETAL SYSTEM: ICD-10-CM

## 2022-03-01 DIAGNOSIS — I10 ESSENTIAL (PRIMARY) HYPERTENSION: ICD-10-CM

## 2022-03-01 DIAGNOSIS — E78.5 HYPERLIPIDEMIA, UNSPECIFIED: ICD-10-CM

## 2022-03-01 DIAGNOSIS — Z96.641 PRESENCE OF RIGHT ARTIFICIAL HIP JOINT: ICD-10-CM

## 2022-03-01 DIAGNOSIS — I77.810 THORACIC AORTIC ECTASIA: ICD-10-CM

## 2022-03-01 DIAGNOSIS — R94.31 ABNORMAL ELECTROCARDIOGRAM [ECG] [EKG]: ICD-10-CM

## 2022-03-01 DIAGNOSIS — E66.9 OBESITY, UNSPECIFIED: ICD-10-CM

## 2022-03-01 DIAGNOSIS — R06.02 SHORTNESS OF BREATH: ICD-10-CM

## 2022-03-01 DIAGNOSIS — Z01.810 ENCOUNTER FOR PREPROCEDURAL CARDIOVASCULAR EXAMINATION: ICD-10-CM

## 2022-03-01 PROCEDURE — 99214 OFFICE O/P EST MOD 30 MIN: CPT

## 2022-09-06 ENCOUNTER — APPOINTMENT (OUTPATIENT)
Dept: CARDIOLOGY | Facility: CLINIC | Age: 67
End: 2022-09-06

## 2023-01-23 ENCOUNTER — APPOINTMENT (OUTPATIENT)
Dept: CT IMAGING | Facility: CLINIC | Age: 68
End: 2023-01-23

## 2023-06-10 ENCOUNTER — OFFICE (OUTPATIENT)
Dept: URBAN - METROPOLITAN AREA CLINIC 38 | Facility: CLINIC | Age: 68
Setting detail: OPHTHALMOLOGY
End: 2023-06-10
Payer: COMMERCIAL

## 2023-06-10 DIAGNOSIS — H25.89: ICD-10-CM

## 2023-06-10 DIAGNOSIS — H25.13: ICD-10-CM

## 2023-06-10 DIAGNOSIS — H40.013: ICD-10-CM

## 2023-06-10 PROCEDURE — 92133 CPTRZD OPH DX IMG PST SGM ON: CPT

## 2023-06-10 PROCEDURE — 92004 COMPRE OPH EXAM NEW PT 1/>: CPT

## 2023-06-10 ASSESSMENT — REFRACTION_AUTOREFRACTION
OS_CYLINDER: -0.75
OD_SPHERE: +0.50
OS_SPHERE: +0.25
OS_AXIS: 085
OD_AXIS: 003
OD_CYLINDER: -1.25

## 2023-06-10 ASSESSMENT — REFRACTION_MANIFEST
OS_AXIS: 085
OD_AXIS: 005
OD_CYLINDER: -1.25
OU_VA: 20/20
OD_VA1: 20/20
OD_VA2: 20/20(J1+)
OS_CYLINDER: -0.75
OD_SPHERE: PLANO
OD_ADD: +2.50
OS_VA2: 20/20(J1+)
OD_VA2: 20/20(J1+)
OS_SPHERE: PLANO
OS_VA2: 20/20(J1+)
OD_AXIS: 005
OD_ADD: +2.50
OS_VA1: 20/30-2
OU_VA: 20/20
OS_ADD: +2.50
OD_CYLINDER: -1.25
OD_SPHERE: PLANO
OS_CYLINDER: -0.75
OS_SPHERE: PLANO
OS_ADD: +2.50
OD_VA1: 20/20
OS_AXIS: 085
OS_VA1: 20/30-2

## 2023-06-10 ASSESSMENT — TONOMETRY
OD_IOP_MMHG: 16
OS_IOP_MMHG: 21

## 2023-06-10 ASSESSMENT — SPHEQUIV_DERIVED
OS_SPHEQUIV: -0.125
OD_SPHEQUIV: -0.125

## 2023-06-10 ASSESSMENT — REFRACTION_CURRENTRX
OS_VPRISM_DIRECTION: PROGS
OD_CYLINDER: -0.25
OD_ADD: +1.50
OS_ADD: +1.50
OS_SPHERE: -1.00
OD_OVR_VA: 20/
OS_CYLINDER: -0.50
OS_OVR_VA: 20/
OD_AXIS: 017
OD_SPHERE: -0.75
OD_VPRISM_DIRECTION: PROGS
OS_AXIS: 087

## 2023-06-10 ASSESSMENT — VISUAL ACUITY
OS_BCVA: 20/30-2
OD_BCVA: 20/40

## 2023-06-10 ASSESSMENT — CONFRONTATIONAL VISUAL FIELD TEST (CVF)
OS_FINDINGS: FULL
OD_FINDINGS: FULL

## 2023-06-10 ASSESSMENT — KERATOMETRY
METHOD_AUTO_MANUAL: AUTO
OS_AXISANGLE_DEGREES: 108
OS_K2POWER_DIOPTERS: 43.75
OD_K1POWER_DIOPTERS: 43.00
OD_K2POWER_DIOPTERS: 44.50
OD_AXISANGLE_DEGREES: 089
OS_K1POWER_DIOPTERS: 43.25

## 2023-06-10 ASSESSMENT — LID EXAM ASSESSMENTS
OD_COMMENTS: 1+ MGD
OS_COMMENTS: 1+ MGD
OS_BLEPHARITIS: LUL T
OD_BLEPHARITIS: RUL T

## 2023-06-10 ASSESSMENT — AXIALLENGTH_DERIVED
OS_AL: 23.6407
OD_AL: 23.5489

## 2023-06-10 ASSESSMENT — PACHYMETRY
OD_CT_UM: 541
OS_CT_CORRECTION: 1
OD_CT_CORRECTION: 0
OS_CT_UM: 536

## 2023-07-08 ENCOUNTER — OFFICE (OUTPATIENT)
Dept: URBAN - METROPOLITAN AREA CLINIC 38 | Facility: CLINIC | Age: 68
Setting detail: OPHTHALMOLOGY
End: 2023-07-08
Payer: COMMERCIAL

## 2023-07-08 DIAGNOSIS — H40.012: ICD-10-CM

## 2023-07-08 DIAGNOSIS — H25.89: ICD-10-CM

## 2023-07-08 PROBLEM — H02.89 MEIBOMIAN GLAND DYSFUNCTION ; BOTH EYES: Status: ACTIVE | Noted: 2023-06-10

## 2023-07-08 PROBLEM — H11.153 PINGUECULA; BOTH EYES: Status: ACTIVE | Noted: 2023-06-10

## 2023-07-08 PROCEDURE — 99213 OFFICE O/P EST LOW 20 MIN: CPT

## 2023-07-08 PROCEDURE — 92083 EXTENDED VISUAL FIELD XM: CPT

## 2023-07-08 ASSESSMENT — LID EXAM ASSESSMENTS
OS_BLEPHARITIS: LUL T
OD_BLEPHARITIS: RUL T
OD_COMMENTS: 1+ MGD
OS_COMMENTS: 1+ MGD

## 2023-07-08 ASSESSMENT — TONOMETRY
OS_IOP_MMHG: 16
OD_IOP_MMHG: 16

## 2023-07-08 ASSESSMENT — KERATOMETRY
OD_K1POWER_DIOPTERS: 43.00
OD_K2POWER_DIOPTERS: 44.75
OD_AXISANGLE_DEGREES: 086
OS_K1POWER_DIOPTERS: 43.00
OS_K2POWER_DIOPTERS: 43.75
METHOD_AUTO_MANUAL: AUTO
OS_AXISANGLE_DEGREES: 112

## 2023-07-08 ASSESSMENT — CONFRONTATIONAL VISUAL FIELD TEST (CVF)
OS_FINDINGS: FULL
OD_FINDINGS: FULL

## 2023-07-08 ASSESSMENT — REFRACTION_MANIFEST
OS_SPHERE: PLANO
OD_CYLINDER: -1.25
OS_ADD: +2.50
OD_AXIS: 005
OD_AXIS: 005
OS_VA1: 20/30-2
OS_VA2: 20/20(J1+)
OS_VA1: 20/30-2
OD_VA2: 20/20(J1+)
OD_CYLINDER: -1.25
OS_CYLINDER: -0.75
OD_VA1: 20/20
OS_ADD: +2.50
OD_SPHERE: PLANO
OS_AXIS: 085
OU_VA: 20/20
OD_SPHERE: PLANO
OD_VA2: 20/20(J1+)
OD_VA1: 20/20
OS_AXIS: 085
OU_VA: 20/20
OS_VA2: 20/20(J1+)
OS_CYLINDER: -0.75
OS_SPHERE: PLANO
OD_ADD: +2.50
OD_ADD: +2.50

## 2023-07-08 ASSESSMENT — REFRACTION_CURRENTRX
OS_ADD: +1.50
OD_VPRISM_DIRECTION: PROGS
OS_OVR_VA: 20/
OD_AXIS: 017
OS_SPHERE: -1.00
OS_CYLINDER: -0.50
OS_AXIS: 087
OS_VPRISM_DIRECTION: PROGS
OD_ADD: +1.50
OD_SPHERE: -0.75
OD_OVR_VA: 20/
OD_CYLINDER: -0.25

## 2023-07-08 ASSESSMENT — REFRACTION_AUTOREFRACTION
OD_CYLINDER: -1.25
OS_AXIS: 091
OS_CYLINDER: -0.75
OD_AXIS: 180
OS_SPHERE: +0.25
OD_SPHERE: +0.25

## 2023-07-08 ASSESSMENT — SPHEQUIV_DERIVED
OS_SPHEQUIV: -0.125
OD_SPHEQUIV: -0.375

## 2023-07-08 ASSESSMENT — PACHYMETRY
OS_CT_CORRECTION: 1
OD_CT_CORRECTION: 0
OD_CT_UM: 541
OS_CT_UM: 536

## 2023-07-08 ASSESSMENT — VISUAL ACUITY
OD_BCVA: 20/40
OS_BCVA: 20/30-1

## 2023-07-08 ASSESSMENT — AXIALLENGTH_DERIVED
OS_AL: 23.6869
OD_AL: 23.6003

## 2023-09-07 NOTE — DISCHARGE NOTE NURSING/CASE MANAGEMENT/SOCIAL WORK - NSDCPEELIQUISFU_GEN_ALL_CORE
Go for blood tests as directed. Your doctor will do lab tests at regular visits to monitor the effects of this medicine. Please follow up with your doctor and keep your health care provider appointments.
Lyn Perdomo(Attending)

## 2024-11-11 NOTE — PHYSICAL THERAPY INITIAL EVALUATION ADULT - IMPAIRMENTS CONTRIBUTING TO GAIT DEVIATIONS, PT EVAL
Problem: Nutrition/Hydration-ADULT  Goal: Nutrient/Hydration intake appropriate for improving, restoring or maintaining nutritional needs  Description: Monitor and assess patient's nutrition/hydration status for malnutrition. Collaborate with interdisciplinary team and initiate plan and interventions as ordered.  Monitor patient's weight and dietary intake as ordered or per policy. Utilize nutrition screening tool and intervene as necessary. Determine patient's food preferences and provide high-protein, high-caloric foods as appropriate.     INTERVENTIONS:  - Monitor oral intake, urinary output, labs, and treatment plans  - Assess nutrition and hydration status and recommend course of action  - Evaluate amount of meals eaten  - Assist patient with eating if necessary   - Allow adequate time for meals  - Recommend/ encourage appropriate diets, oral nutritional supplements, and vitamin/mineral supplements  - Order, calculate, and assess calorie counts as needed  - Recommend, monitor, and adjust tube feedings and TPN/PPN based on assessed needs  - Assess need for intravenous fluids  - Provide specific nutrition/hydration education as appropriate  - Include patient/family/caregiver in decisions related to nutrition  Outcome: Progressing      impaired balance/decreased strength

## 2025-03-28 ENCOUNTER — APPOINTMENT (OUTPATIENT)
Dept: OPHTHALMOLOGY | Facility: CLINIC | Age: 70
End: 2025-03-28
Payer: MEDICARE

## 2025-03-28 ENCOUNTER — APPOINTMENT (OUTPATIENT)
Dept: OPHTHALMOLOGY | Facility: CLINIC | Age: 70
End: 2025-03-28
Payer: SELF-PAY

## 2025-03-28 ENCOUNTER — NON-APPOINTMENT (OUTPATIENT)
Age: 70
End: 2025-03-28

## 2025-03-28 PROCEDURE — 92015 DETERMINE REFRACTIVE STATE: CPT

## 2025-03-28 PROCEDURE — 92004 COMPRE OPH EXAM NEW PT 1/>: CPT

## 2025-03-28 PROCEDURE — 92134 CPTRZ OPH DX IMG PST SGM RTA: CPT
